# Patient Record
Sex: FEMALE | Race: WHITE | Employment: UNEMPLOYED | URBAN - METROPOLITAN AREA
[De-identification: names, ages, dates, MRNs, and addresses within clinical notes are randomized per-mention and may not be internally consistent; named-entity substitution may affect disease eponyms.]

---
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
## 2023-05-27 NOTE — ED PROVIDER NOTES
History     Chief Complaint:  Headache    The history is provided by the patient and a parent.      Nazia Vanessa is a normally healthy 18 year old female who presents to the emergency department for evaluation of a headache. For the past two weeks, the patient has had an ongoing bilateral headache that she woke up with without prior trauma. She has a history of prior concussion from playing soccer but no head trauma in the past few years.  She saw her PMD for this headache and had an MRI three days ago and blood work, which were normal (results below) and she was told to come here to the emergency department for further evaluation if the headache continued or worsened. Since seeing her doctor, she has tried the Imitrex she received from her doctor which did not help with the headache, as well as Excedrin migraine without relief. She has associated nausea, head fogginess, fatigue, and generalized weakness but denies swollen tonsils, swollen lymph nodes, muscle aches, sore throat, fevers, neck pain, or neck stiffness. She states she has had some left arm pain but no numbness anywhere. She also is having to work to focus in school given the head pain. The patient has an appointment with a neurologist scheduled for tomorrow.  In addition, mom is concerned because the patient does not get long lasting headaches like this and states the patient cannot function. Mom notes the patient has been having some urinary incontinence and has been a urologist for this and has urine testing results pending. She also had mono last summer. Dad states the patient is eating and drinking normally.     Her prior history of headaches are different than today's headache. Her previous headaches were short in duration, not localized to any particular area of her head, and did not cause the head fogginess she currently has. These headaches started in her younger teen years.     MR Brain w/o & w/ contrast:  Negative, no structural  "abnormalities are identified. No  bleed, mass, or infarcts are seen. As per radiology.     CBC: WBC: 7.7, HGB: 13.7, PLT: 240      Allergies:  NKDA     Medications:    The patient is not currently taking any prescribed medications.    Past Medical History:    The patient denies any significant past medical history.    Past Surgical History:    The patient does not have any pertinent past surgical history.    Family History:    No past pertinent family history.    Social History:  Negative for tobacco use.  Negative for alcohol use.  Negative for drug use.  Marital Status:  Single      Review of Systems   Constitutional: Positive for fatigue. Negative for fever.   HENT: Negative for sore throat.    Gastrointestinal: Positive for nausea.   Genitourinary:        Positive for some urinary incontinence.   Musculoskeletal: Negative for neck pain and neck stiffness.   Neurological: Positive for weakness and headaches. Negative for numbness.   All other systems reviewed and are negative.    Physical Exam     Patient Vitals for the past 24 hrs:   BP Temp Temp src Pulse Resp SpO2 Height Weight   03/04/19 1532 -- -- -- -- -- 100 % -- --   03/04/19 1413 117/74 98.7  F (37.1  C) Temporal 53 18 98 % 1.676 m (5' 6\") 60.8 kg (134 lb 0.6 oz)     Physical Exam    Nursing note and vitals reviewed.  Constitutional: Cooperative.   HENT:   Mouth/Throat: Moist mucous membranes.   Eyes: nonicteric sclera  Cardiovascular: Normal rate, regular rhythm, no murmurs, rubs, or gallops  Pulmonary/Chest: Effort normal and breath sounds normal. No respiratory distress. No wheezes. No rales.   Abdominal: Soft. Nontender, nondistended, no guarding or rigidity. BS present.   Musculoskeletal: Normal range of motion.   Neurological: Alert. Moves all extremities spontaneously.     CN's II-XII without obvious abnormality.  Gait is normal w/o ataxia.  Neg Romberg.      5/5 UE and LE strength which is symmetrical.     Reflexes are 2+ and symmetrical. " Negative Pronator drift.      Finger to Nose testing is intact bilaterally.      Light touch is symmetrical bilateral UE's and LE's.  PERRL, EOMI.      No meningismus and full neck AROM/PROM.  Skin: Skin is warm and dry. No rash noted.   Psychiatric: Normal mood and affect.     Emergency Department Course   Laboratory:  CRP inflammation: <2.9  CMP: All WNL (Creatinine: 0.82)  ISTAT HCG quantitative pregnancy POCT: <5.0  Mononucleosis screen: Negative    Interventions:  1524 Toradol 15 mg IV  1524 Reglan 10 mg IV  1524 NS 1L IV  1525 Decadron 10 mg IV  1525 Benadryl 25 mg IV    Emergency Department Course:  1445 Nursing notes and vitals reviewed.  I performed an exam of the patient as documented above.     IV inserted. Medicine administered as documented above. Blood drawn. This was sent to the lab for further testing, results above.    1650 I rechecked the patient and discussed the results of her workup thus far.     Findings and plan explained to the Patient and mother and father. Patient discharged home with instructions regarding supportive care, medications, and reasons to return. The importance of close follow-up was reviewed.     I personally reviewed the laboratory results with the Patient and mother and father and answered all related questions prior to discharge.   Impression & Plan    Medical Decision Making:  Nazia Vanessa is a 18 year old female who presents with a headache. She has a history of headaches.  I considered a broad differential diagnosis for this patient including tension, migraine, analgesic rebound, occipital neuralgia, etc.  I also considered other less common but serious causes considered included meningitis, encephalitis, subarachnoid bleed, temporal arteritis, stroke, tumor, etc.  She has no signs of serious headache etiologies at this point.  No advanced imaging is indicated, nor is CT/lumbar puncture for SAH. She already has negative MRI recently performed.  Her questions were  answered and she feels improved after above interventions in ED - in fact reporting her headache was down to a '0.'  Supportive outpatient management is therefore indicated.  Headache precautions given for home. Pt has follow-up with neurology tomorrow. She's discharged in stable condition.     Diagnosis:    ICD-10-CM    1. Acute nonintractable headache, unspecified headache type R51        Disposition:  discharged to home with her mother and father    Scribe Disclosure:  I, Devika Mariaelena, am serving as a scribe on 3/4/2019 at 2:51 PM to personally document services performed by Aubrey Nelson MD based on my observations and the provider's statements to me.     Devika Ferrell  3/4/2019   [facility] EMERGENCY DEPARTMENT       Aubrey Nelson MD  03/06/19 [id number]     lower quadrant/Right: